# Patient Record
Sex: MALE
[De-identification: names, ages, dates, MRNs, and addresses within clinical notes are randomized per-mention and may not be internally consistent; named-entity substitution may affect disease eponyms.]

---

## 2021-04-30 ENCOUNTER — TRANSCRIPTION ENCOUNTER (OUTPATIENT)
Age: 66
End: 2021-04-30

## 2022-08-09 ENCOUNTER — NON-APPOINTMENT (OUTPATIENT)
Age: 67
End: 2022-08-09

## 2022-08-19 ENCOUNTER — NON-APPOINTMENT (OUTPATIENT)
Age: 67
End: 2022-08-19

## 2023-11-09 ENCOUNTER — NON-APPOINTMENT (OUTPATIENT)
Age: 68
End: 2023-11-09

## 2025-04-15 VITALS
OXYGEN SATURATION: 100 % | TEMPERATURE: 97 F | RESPIRATION RATE: 14 BRPM | HEART RATE: 65 BPM | SYSTOLIC BLOOD PRESSURE: 126 MMHG | WEIGHT: 163.8 LBS | DIASTOLIC BLOOD PRESSURE: 79 MMHG | HEIGHT: 72 IN

## 2025-04-15 RX ORDER — SILDENAFIL 50 MG/1
1 TABLET, FILM COATED ORAL
Refills: 0 | DISCHARGE

## 2025-04-15 NOTE — ASU PREOP CHECKLIST - SELECT TESTS ORDERED
BMP/CBC/EKG INDICATION:



Pain and abdominal distention. History of obstruction. 



COMPARISON:



CT of the abdomen and pelvis without contrast from 04/08/2021. CT of the 

abdomen and pelvis with contrast from 06/23/2020 



TECHNIQUE:



CT examination of the abdomen and pelvis was performed with the uneventful 

intravenous administration of 94 cc of Isovue-300 while 3 mm thick axial 

sections were obtained from the lung bases through the pubic symphysis. 

Oral contrast was not administered.  



Please note that all CT scans at this facility use dose modulation, 

iterative reconstruction, and/or weight-based dosing when appropriate to 

reduce radiation dose to as low as reasonably achievable. 



FINDINGS:



There is increased dilatation of multiple small bowel loops, now with 

moderate distention with gas and fluid, representing worsening partial 

small-bowel obstruction. The point of transition appears to be in the left 

lower abdomen on axial image 68 series 3 and coronal image 59 series 4, 

where there is a focal area of thickening of the wall of the small bowel, 

suggesting a stricture. The small bowel distal to this is normal in 

caliber. 



In the abdomen, the liver is again seen to have moderate intrahepatic 

ductal dilatation when compared to the previous contrast enhanced exam



This is associated with stable severe dilatation of the common bile duct to 

16 millimeters and surgical clips from cholecystectomy. The findings are 

consistent with post-cholecystectomy status. The liver is otherwise normal 

in appearance. 



The spleen, pancreas, and adrenals are normal in appearance. 



Again seen is moderate atrophy of the left kidney with a 1.5 centimeter 

cyst in the upper pole of the left kidney. 



The right kidney remains normal in appearance. 



The abdominal aorta is normal in caliber with no sign of dilatation. There 

is no sign of retroperitoneal mass or adenopathy. 



The stomach is now filled with gas and is moderately distended. The 

previously-seen fullness in the area of the gastric fundus is no longer 

present and was simply the result of underdistention.



The colon in the abdomen is relatively decompressed and is otherwise normal 

in appearance. 



In the pelvis, the appendix is nonvisualized, but there is no sign of an 

inflammatory process in the area of the appendix.



The loops of small bowel and colon in the pelvis are normal in appearance. 



The prostate remains prominently enlarged but is otherwise normal in 

appearance. 



The urinary bladder is now seen to be mildly distended, significantly 

improved in appearance compared to the previous study. There continue to be 

multiple small bladder diverticuli along the posterior margin of the 

urinary bladder consistent with bladder outlet obstruction.



There is no sign of pelvic or inguinal mass or adenopathy.



Again seen are postoperative changes of right inguinal hernia repair with 

multiple surgical clips. There is no sign of recurrence of a hernia. 



There is no sign of free air or free fluid in the abdomen or pelvis. 



There is new mild patchy density in the posterior lung bases consistent 

with atelectasis.



Again seen is severe L5-S1 and moderate at L3-4 and L4-5 disc degenerative 

disease. Mild disc degenerative disease is again seen throughout the rest 

of the lumbar spine.



Again seen is mild anterior wedging of the T11 and T12 vertebral bodies 

consistent with old mild compression fractures. 



IMPRESSION:



Worsening of partial small-bowel obstruction, now with moderate dilatation 

of the small bowel extending to what appears to be a stricture located in 

the left lower abdomen. 



No sign of bowel perforation or free fluid.



CT of the abdomen shows continued moderate intrahepatic and severe 

extrahepatic biliary ductal dilatation consistent with post cholecystectomy 

status.



Stable moderate left renal atrophy.



CT of the pelvis shows stable severe enlargement of the prostate. 



Decreased distention of the urinary bladder, now mild. Again seen are 

multiple posterior urinary bladder diverticuli.



Please note that all CT scans at this facility use dose modulation, 

iterative reconstruction, and/or weight-based dosing when appropriate to 

reduce radiation dose to as low as reasonably achievable.



Dictated by Jason Chew MD @ 5/3/2021 10:53:40 PM



Signed by Dr. Jason Chew @ May  3 2021 10:53PM BMP/CBC/Type and Screen/EKG

## 2025-04-15 NOTE — ASU PATIENT PROFILE, ADULT - MENTAL HEALTH CONDITIONS/SYMPTOMS, PROFILE
ENT [id number] made aware. Ent wants patent to have it. Patient explained reasoning for medication. Patient still refusing. none

## 2025-04-15 NOTE — ASU PATIENT PROFILE, ADULT - NSICDXPASTSURGICALHX_GEN_ALL_CORE_FT
PAST SURGICAL HISTORY:  H/O knee surgery left knee meniscus x2    Previous back surgery lumbar discectomy    S/P tonsillectomy

## 2025-04-15 NOTE — ASU PATIENT PROFILE, ADULT - NSICDXPASTMEDICALHX_GEN_ALL_CORE_FT
PAST MEDICAL HISTORY:  Atrial fibrillation     HLD (hyperlipidemia)     HTN (hypertension)     Prediabetes     Stroke x2, 2014 6 months ago

## 2025-04-16 ENCOUNTER — OUTPATIENT (OUTPATIENT)
Dept: INPATIENT UNIT | Facility: HOSPITAL | Age: 70
LOS: 1 days | End: 2025-04-16
Payer: COMMERCIAL

## 2025-04-16 ENCOUNTER — TRANSCRIPTION ENCOUNTER (OUTPATIENT)
Age: 70
End: 2025-04-16

## 2025-04-16 VITALS
HEART RATE: 55 BPM | RESPIRATION RATE: 14 BRPM | DIASTOLIC BLOOD PRESSURE: 66 MMHG | OXYGEN SATURATION: 100 % | SYSTOLIC BLOOD PRESSURE: 124 MMHG

## 2025-04-16 DIAGNOSIS — Z90.89 ACQUIRED ABSENCE OF OTHER ORGANS: Chronic | ICD-10-CM

## 2025-04-16 DIAGNOSIS — Z98.890 OTHER SPECIFIED POSTPROCEDURAL STATES: Chronic | ICD-10-CM

## 2025-04-16 LAB
BLD GP AB SCN SERPL QL: NEGATIVE — SIGNIFICANT CHANGE UP
RH IG SCN BLD-IMP: POSITIVE — SIGNIFICANT CHANGE UP

## 2025-04-16 PROCEDURE — 86901 BLOOD TYPING SEROLOGIC RH(D): CPT

## 2025-04-16 PROCEDURE — 86900 BLOOD TYPING SEROLOGIC ABO: CPT

## 2025-04-16 PROCEDURE — 88302 TISSUE EXAM BY PATHOLOGIST: CPT | Mod: 26

## 2025-04-16 PROCEDURE — 49505 PRP I/HERN INIT REDUC >5 YR: CPT | Mod: RT

## 2025-04-16 PROCEDURE — 88304 TISSUE EXAM BY PATHOLOGIST: CPT | Mod: 26

## 2025-04-16 PROCEDURE — 88304 TISSUE EXAM BY PATHOLOGIST: CPT

## 2025-04-16 PROCEDURE — 88302 TISSUE EXAM BY PATHOLOGIST: CPT

## 2025-04-16 PROCEDURE — 86850 RBC ANTIBODY SCREEN: CPT

## 2025-04-16 PROCEDURE — 49550 RPR REM HERNIA INIT REDUCE: CPT | Mod: RT,XS

## 2025-04-16 PROCEDURE — C1781: CPT

## 2025-04-16 DEVICE — MESH PROGRIP LAP SELF FIX 15X15CM: Type: IMPLANTABLE DEVICE | Site: RIGHT | Status: FUNCTIONAL

## 2025-04-16 RX ORDER — ATORVASTATIN CALCIUM 80 MG/1
0.5 TABLET, FILM COATED ORAL
Refills: 0 | DISCHARGE

## 2025-04-16 RX ORDER — BUPIVACAINE 13.3 MG/ML
20 INJECTION, SUSPENSION, LIPOSOMAL INFILTRATION ONCE
Refills: 0 | Status: DISCONTINUED | OUTPATIENT
Start: 2025-04-16 | End: 2025-04-16

## 2025-04-16 RX ORDER — NEBIVOLOL 2.5 MG/1
1 TABLET ORAL
Refills: 0 | DISCHARGE

## 2025-04-16 RX ORDER — TAMSULOSIN HYDROCHLORIDE 0.4 MG/1
0.4 CAPSULE ORAL ONCE
Refills: 0 | Status: COMPLETED | OUTPATIENT
Start: 2025-04-16 | End: 2025-04-16

## 2025-04-16 RX ORDER — HYDROMORPHONE/SOD CHLOR,ISO/PF 2 MG/10 ML
0.2 SYRINGE (ML) INJECTION
Refills: 0 | Status: DISCONTINUED | OUTPATIENT
Start: 2025-04-16 | End: 2025-04-16

## 2025-04-16 RX ORDER — RIVAROXABAN 10 MG/1
1 TABLET, FILM COATED ORAL
Refills: 0 | DISCHARGE

## 2025-04-16 RX ORDER — LORAZEPAM 4 MG/ML
1 VIAL (ML) INJECTION
Refills: 0 | DISCHARGE

## 2025-04-16 RX ORDER — ONDANSETRON HCL/PF 4 MG/2 ML
4 VIAL (ML) INJECTION ONCE
Refills: 0 | Status: DISCONTINUED | OUTPATIENT
Start: 2025-04-16 | End: 2025-04-16

## 2025-04-16 RX ADMIN — TAMSULOSIN HYDROCHLORIDE 0.4 MILLIGRAM(S): 0.4 CAPSULE ORAL at 18:01

## 2025-04-16 NOTE — ASU DISCHARGE PLAN (ADULT/PEDIATRIC) - ASU DC SPECIAL INSTRUCTIONSFT
Follow up with Dr. Reed in 1-2 weeks. Call the office at the number below to schedule your appointment. You may shower; soap and water over incision sites. Do not scrub. Pat dry when done. No tub bathing or swimming until cleared. Keep incision sites out of the sun as scars will darken. Ambulate as tolerated, but no heavy lifting (>10lbs) or strenuous exercise. You may resume regular diet. You should be urinating at least 3-4x per day. Call the office if you experience increasing abdominal pain, nausea, vomiting, or temperature >101 F.    Please keep track of how much Tylenol (acetaminophen) you are taking. Do not take more than 4,000 mg per day. Be aware that Percocet and Tylenol #3 has tylenol in it.

## 2025-04-16 NOTE — PRE-ANESTHESIA EVALUATION ADULT - HEIGHT IN CM
[employer] notified for change in code status plans- not candidate.  Referral number [id number]

## 2025-04-16 NOTE — PRE-ANESTHESIA EVALUATION ADULT - NSANTHPMHFT_GEN_ALL_CORE
69yo M with HTN, HL, CVA 2014 x2, denies residual deficits, p-A-fib on Xarelto (did not stop, the surgeon is aware), pre DM presents for inguinal hernia repair. He is physically active, works out daily.

## 2025-04-16 NOTE — PROGRESS NOTE ADULT - SUBJECTIVE AND OBJECTIVE BOX
Procedure: R IHR and femoral hernia repair w/ mesh  Surgeon: Dr. Reed    S: Pt has no complaints, resting comfortably in chair. Denies CP, SOB, COOPER. Tolerating drinking water with no associated nausea or vomiting. Pain controlled with medication.    O:  T(C): --  T(F): --  HR: 55 (04-16-25 @ 19:10) (53 - 72)  BP: 117/69 (04-16-25 @ 19:10) (111/57 - 137/67)  RR: 16 (04-16-25 @ 19:10) (12 - 20)  SpO2: 94% (04-16-25 @ 19:10) (94% - 100%)  Wt(kg): --            Gen: NAD, resting comfortably in bed  C/V: NSR  Pulm: Nonlabored breathing, no respiratory distress  Abd: soft, NT/ND Incision: CDI  Extrem: WWP, SCDs in place

## 2025-04-16 NOTE — BRIEF OPERATIVE NOTE - OPERATION/FINDINGS
5cm incision carried down to external abdominal oblique fascia parallel to inguinal ligament. External abdominal oblique fascia sharply divided, spermatic cord bluntly mobilized off inguinal ligament, and sliding direct inguinal hernia appreciated. Hernia reduced and sac suture ligated with vicryl. Retroperitoneal fat containing femoral hernia appreciated- fat ligated at base and sent for final. Mich's ligament readily opposed to Poupart's ligament, and femoral hernia obliterated with interrupted prolene. Progrip mesh cut to fashion and secured to pubic tubercle with simple prolene. Inferior edge of mesh secured to shelving edge with running prolene. Superior edge secured to transversalis fascia with interrupted prolene. External abdominal oblique fascia closed with running vicryl. Camper's and dirk's closed with interrupted vicryl. Skin closed with running 4-0 monocryl.  5cm incision carried down to external abdominal oblique fascia parallel to inguinal ligament. External abdominal oblique fascia sharply divided, spermatic cord bluntly mobilized off inguinal ligament, and sliding direct inguinal hernia appreciated. Hernia reduced and sac suture ligated with vicryl. Retroperitoneal fat containing femoral hernia appreciated- fat ligated at base and sent for final. Conjoint tender readily opposed to Mich's ligament, and femoral hernia obliterated with interrupted prolene. Progrip mesh cut to fashion and secured to pubic tubercle with simple prolene. Inferior edge of mesh secured to shelving edge with running prolene. Superior edge secured to transversalis fascia with interrupted prolene. External abdominal oblique fascia closed with running vicryl. Camper's and dirk's closed with interrupted vicryl. Skin closed with running 4-0 monocryl.

## 2025-04-16 NOTE — BRIEF OPERATIVE NOTE - NSICDXBRIEFPOSTOP_GEN_ALL_CORE_FT
POST-OP DIAGNOSIS:  Right inguinal hernia 16-Apr-2025 18:27:57  Toma Pickering  Femoral hernia of right side 16-Apr-2025 18:28:12  Toma Pickering

## 2025-04-16 NOTE — ASU DISCHARGE PLAN (ADULT/PEDIATRIC) - CARE PROVIDER_API CALL
Erick Reed  Surgery  [address], [address] [place] [id number]  Phone: [phone]  Fax: [phone]  Established Patient  Follow Up Time: 2 weeks

## 2025-04-16 NOTE — ASU DISCHARGE PLAN (ADULT/PEDIATRIC) - NS MD DC FALL RISK RISK
For information on Fall & Injury Prevention, visit: [email or web address] OR  [email or web address] OR  https://www.cdc.gov/steadi/patient.html

## 2025-04-16 NOTE — ASU DISCHARGE PLAN (ADULT/PEDIATRIC) - FINANCIAL ASSISTANCE
[facility] provides services at a reduced cost to those who are determined to be eligible through [facility]’s financial assistance program. Information regarding [facility]’s financial assistance program can be found by going to [email or web address] or by calling [phone].

## 2025-04-16 NOTE — BRIEF OPERATIVE NOTE - NSICDXBRIEFPROCEDURE_GEN_ALL_CORE_FT
PROCEDURES:  Open repair of recurrent inguinal hernia using synthetic mesh 16-Apr-2025 18:27:28  Toma Pickering  Open repair of femoral hernia 16-Apr-2025 18:27:39  Toma Pickering

## 2025-04-16 NOTE — PROGRESS NOTE ADULT - ASSESSMENT
70M w/ PMH HTN, TIA, Afib (on Xarelto), preDM, HLD, BPH and right inguinal hernia presenting for elective repair. Now s/p   R IHR and femoral hernia repair w/ mesh    Regular diet  Pain/nausea control  Discharge to home once voids

## (undated) DEVICE — SUT MONOCRYL 4-0 18" PS-2

## (undated) DEVICE — SUT VICRYL 2-0 27" SH

## (undated) DEVICE — DRAPE TOWEL BLUE 17" X 24"

## (undated) DEVICE — SUT VICRYL PLUS 2-0 18" TIES UNDYED

## (undated) DEVICE — TUBING STRYKER PNEUMOCLEAR SMOKE EVACUATION HIGH FLOW

## (undated) DEVICE — POSITIONER FOAM EGG CRATE ULNAR 2PCS (PINK)

## (undated) DEVICE — PACK GENERAL MINOR

## (undated) DEVICE — GLV 7 PROTEXIS (WHITE)

## (undated) DEVICE — VENODYNE/SCD SLEEVE CALF MEDIUM

## (undated) DEVICE — SUT PROLENE 0 30" CT-2

## (undated) DEVICE — ELCTR BOVIE TIP BLADE INSULATED 2.75" EDGE